# Patient Record
Sex: FEMALE | Race: WHITE | NOT HISPANIC OR LATINO
[De-identification: names, ages, dates, MRNs, and addresses within clinical notes are randomized per-mention and may not be internally consistent; named-entity substitution may affect disease eponyms.]

---

## 2020-02-27 ENCOUNTER — RESULT REVIEW (OUTPATIENT)
Age: 75
End: 2020-02-27

## 2021-11-24 PROBLEM — Z00.00 ENCOUNTER FOR PREVENTIVE HEALTH EXAMINATION: Status: ACTIVE | Noted: 2021-11-24

## 2021-12-17 ENCOUNTER — APPOINTMENT (OUTPATIENT)
Dept: ORTHOPEDIC SURGERY | Facility: CLINIC | Age: 76
End: 2021-12-17
Payer: MEDICARE

## 2021-12-17 VITALS
BODY MASS INDEX: 32.18 KG/M2 | SYSTOLIC BLOOD PRESSURE: 100 MMHG | HEIGHT: 67 IN | DIASTOLIC BLOOD PRESSURE: 62 MMHG | WEIGHT: 205 LBS | HEART RATE: 74 BPM

## 2021-12-17 PROCEDURE — 72082 X-RAY EXAM ENTIRE SPI 2/3 VW: CPT

## 2021-12-17 PROCEDURE — 99204 OFFICE O/P NEW MOD 45 MIN: CPT

## 2021-12-17 NOTE — PHYSICAL EXAM
[de-identified] : Cervical Physical Exam\par \par Gait - Antalgic gait\par \par Station - Normal\par \par Sagittal balance - Normal \par \par Compensatory mechanism? - None\par \par Horizontal gaze - Maintained\par \par Reflexes\par Biceps - Normal\par Triceps - Normal\par Brachioradialis - Normal\par Patellar - Normal\par Gastroc - Normal\par Clonus -No\par \par Morales´s - None\par \par Shoulder Exam - Normal\par \par Spurling´s - None\par \par Wrist Pulses -2+ radial/ulnar\par \par Foot Pulses -2+ DP/PT\par \par Cervical range of motion - Normal\par \par Sensation \par C5-T1 sensation intact to light touch bilaterally\par \par L1-S1 sensation intact to light touch bilaterally\par \par Motor\par \par \par 	Deltoid	Biceps	Triceps	WF	WE	IO	\par Right	3+/5	5/5	5/5	5/5	5/5	5/5	5/5\par Left	5/5	5/5	5/5	5/5	5/5	5/5	5/5\par \par \par 	IP	Quad	HS	TA	Gastroc	EHL\par Right	5/5	5/5	5/5	5/5	5/5	5/5\par Left	5/5	5/5	5/5	5/5	5/5	5/5 [de-identified] : Scoliosis radiographs\par 40 degrees of pelvic incidence\par 42 degrees of lumbar lordosis

## 2021-12-17 NOTE — HISTORY OF PRESENT ILLNESS
[de-identified] : This is a 76-year-old female who is here today for evaluation of her neck and back complaints.  In terms of her neck pain the majority of her pain is in her mid back between her scapula.  She denies any radiating symptoms.  She does endorse issues with balance.  She does endorse issues with dropping objects.\par \par Terms of her back pain she cannot walk even 4 blocks due to pain.  She does have significant bilateral lower extremity symptoms as well.  She denies any bowel bladder issues.  She denies any saddle anesthesia.

## 2021-12-23 DIAGNOSIS — M54.9 DORSALGIA, UNSPECIFIED: ICD-10-CM

## 2022-01-27 PROBLEM — Z00.00 ENCOUNTER FOR PREVENTIVE HEALTH EXAMINATION: Status: ACTIVE | Noted: 2022-01-27

## 2022-01-31 ENCOUNTER — APPOINTMENT (OUTPATIENT)
Dept: ORTHOPEDIC SURGERY | Facility: CLINIC | Age: 77
End: 2022-01-31

## 2022-02-14 ENCOUNTER — APPOINTMENT (OUTPATIENT)
Dept: ORTHOPEDIC SURGERY | Facility: CLINIC | Age: 77
End: 2022-02-14
Payer: MEDICARE

## 2022-02-14 VITALS — BODY MASS INDEX: 32.33 KG/M2 | HEIGHT: 67 IN | WEIGHT: 206 LBS

## 2022-02-14 DIAGNOSIS — G89.29 PAIN IN THORACIC SPINE: ICD-10-CM

## 2022-02-14 DIAGNOSIS — M54.6 PAIN IN THORACIC SPINE: ICD-10-CM

## 2022-02-14 PROCEDURE — 99213 OFFICE O/P EST LOW 20 MIN: CPT

## 2022-02-14 NOTE — PHYSICAL EXAM
[de-identified] : Constitutional: Well-nourished, well-developed, No acute distress, obese\par Respiratory:  Good respiratory effort, no SOB\par Lymphatic: No regional lymphadenopathy, no lymphedema\par Psychiatric: Pleasant and normal affect, alert and oriented x3\par Skin: Clean dry and intact B/L UE/LE\par Musculoskeletal: normal except where as noted in regional exam\par \par Cervical Spine Exam\par APPEARANCE: no marked deformities or malalignment, normal curvature, good posture\par POSITIVE TENDERNESS: + Bilateral upper trapezius, levator scapula, rhomboid major, and rhomboid minor, + spasm noted in the same muscles.\par NONTENDER: no bony midline tenderness.\par ROM: limited in all planes, most notably in flexion and sidebending due to pain\par RESISTIVE TESTING: painful 4/5 resisted ext, bilateral sidebending, rotation and shoulder shrug , 5/5 flexion \par SPECIAL TESTS: neg Spurling's b/l\par Vasc: 2+ radial pulse b/l\par Neuro: C5 - T1 intact to motor, DTRs 2+/4 biceps, triceps, brachioradialis\par Sensation: Intact to light touch throughout b/l UE\par \par Thoracic Spine Exam:\par \par normal curvature and normal alignment. good posture. no midline bony tenderness, + marked spasm and associated tenderness of bilateral paraspinal and periscapular muscles.  ROM mildly limited in sidebending and rotation due to noted spasm\par \par \par Lumbar Spine Exam\par \par APPEARANCE: no marked deformities or malalignment, + loss of lordotic curvature of the lumbosacral spine. + poor posture\par POSITIVE TENDERNESS: + IL/SI/ST ligs, + TTP of b/l SI joints, + b/l lower lumbar tenderness and spasm noted in erector spinae and quadratus lumborum\par NONTENDER: no bony midline tenderness.\par ROM: Mildly limited in all directions, mildly painful at end range of flexion and extension\par RESISTIVE TESTING: painful 4/5 resisted flex/ext, sidebending b/l, and rotation\par SPECIAL TESTS: neg SLR b/l, neg ARIANNA b/l, neg Trendelenburg b/l \par PULSES: 2+ DP/PT pulses\par NEURO:  L1 - S2 intact to sensation and motor, DTRs 2+/4 patella and achilles\par \par \par

## 2022-02-14 NOTE — DISCUSSION/SUMMARY
[de-identified] : Discussed findings of today's exam and possible causes of patient's pain.  Educated patient on their most probable diagnosis of chronic total back pain in the cervical, thoracic and lumbar areas, recent atraumatic exacerbation of primarily thoracic back pain without radiculopathy.  Reviewed possible courses of treatment, and we collaboratively decided best course of treatment at this time will include conservative management.  Patient has numerous years of total back pain, she has been seen by multiple providers for this issue in the past, she has been seen by one of my [facility] who recommended imaging at that time, patient never went to undergo that imaging study.  Patient is primarily been managed by her neurologist and she is on opioid medications by that provider.  Patient has done physical therapy in the past, however she is very hesitant regarding PT at this time to the COVID-19 pandemic.  Patient recently had a CT of the chest due to a pulmonary issue, she does not bring that study with her today.  Patient is advised that I am a nonsurgical sports medicine provider, chronic back pain with multilevel degenerative disc disease and narcotic pain management does not fall within my scope of practice.  Patient is advised I recommend physiatry consultation to discuss risk/benefits of epidural steroid injections.  Patient is recommended to undergo a new course of physical therapy, she would like to defer until she can consider injections in her back.  Patient is advised that she has persisting back pain she can be seen by orthopedic spine for reevaluation at that time.  Patient appreciates and agrees with current plan.\par \par I work as part of an academic orthopedic group and routinely have a physician in training (resident / fellow) working with me.  Any part of the history and physical exam performed by the physician in training was either directly reviewed and/or replicated by myself.  Any procedure performed by the physician in training was performed under my direct supervision and with the consent of the patient.\par \par This note was generated using dragon medical dictation software.  A reasonable effort has been made for proofreading its contents, but typos may still remain.  If there are any questions or points of clarification needed please notify my office.

## 2022-02-14 NOTE — HISTORY OF PRESENT ILLNESS
[de-identified] : Patient is here for back pain that has been a chronic issue. She is unable to identify any particular cause of her pain. She feels that it has worsened. She has seen multiple providers in the past for this issue including orthopedics, neurology, pain management. She is currently taking Oxycodone prescribed by her neurologist. She has attended PT in the past. She was seen by Dr. Roberts in December. He recommended updated imaging. Patient did not undergo those procedures because she had a CT of her chest done for pleural effusions caused by her lupus. She does not recall the name of the facility where that imaging was done. \par \par The patient's past medical history, past surgical history, medications and allergies were reviewed by me today and documented accordingly. In addition, the patient's family and social history, which were noncontributory to this visit, were reviewed also. Intake form was reviewed. The patient has no family history of arthritis.

## 2022-05-22 ENCOUNTER — APPOINTMENT (OUTPATIENT)
Dept: MRI IMAGING | Facility: CLINIC | Age: 77
End: 2022-05-22

## 2022-05-22 ENCOUNTER — OUTPATIENT (OUTPATIENT)
Dept: OUTPATIENT SERVICES | Facility: HOSPITAL | Age: 77
LOS: 1 days | End: 2022-05-22
Payer: MEDICARE

## 2022-05-22 DIAGNOSIS — M54.16 RADICULOPATHY, LUMBAR REGION: ICD-10-CM

## 2022-05-22 PROCEDURE — 72146 MRI CHEST SPINE W/O DYE: CPT | Mod: ME

## 2022-05-22 PROCEDURE — 72141 MRI NECK SPINE W/O DYE: CPT | Mod: ME

## 2022-05-22 PROCEDURE — G1004: CPT

## 2022-05-22 PROCEDURE — 72148 MRI LUMBAR SPINE W/O DYE: CPT | Mod: MH

## 2022-07-13 ENCOUNTER — APPOINTMENT (OUTPATIENT)
Dept: ORTHOPEDIC SURGERY | Facility: CLINIC | Age: 77
End: 2022-07-13

## 2022-07-13 VITALS
SYSTOLIC BLOOD PRESSURE: 105 MMHG | BODY MASS INDEX: 32.65 KG/M2 | HEART RATE: 90 BPM | WEIGHT: 208 LBS | DIASTOLIC BLOOD PRESSURE: 68 MMHG | HEIGHT: 67 IN

## 2022-07-13 DIAGNOSIS — M54.9 DORSALGIA, UNSPECIFIED: ICD-10-CM

## 2022-07-13 DIAGNOSIS — G89.29 DORSALGIA, UNSPECIFIED: ICD-10-CM

## 2022-07-13 PROCEDURE — 99214 OFFICE O/P EST MOD 30 MIN: CPT

## 2022-07-13 RX ORDER — NAPROXEN 500 MG/1
500 TABLET ORAL
Qty: 120 | Refills: 0 | Status: ACTIVE | COMMUNITY
Start: 2022-06-29

## 2022-07-13 RX ORDER — PHENAZOPYRIDINE HYDROCHLORIDE 100 MG/1
100 TABLET ORAL
Qty: 15 | Refills: 0 | Status: ACTIVE | COMMUNITY
Start: 2022-05-18

## 2022-07-13 RX ORDER — CLONAZEPAM 2 MG/1
2 TABLET ORAL
Qty: 30 | Refills: 0 | Status: ACTIVE | COMMUNITY
Start: 2022-03-17

## 2022-07-13 RX ORDER — CEFPODOXIME PROXETIL 200 MG/1
200 TABLET, FILM COATED ORAL
Qty: 14 | Refills: 0 | Status: ACTIVE | COMMUNITY
Start: 2022-05-18

## 2022-07-13 RX ORDER — ALBUTEROL SULFATE 90 UG/1
108 (90 BASE) INHALANT RESPIRATORY (INHALATION)
Qty: 8 | Refills: 0 | Status: ACTIVE | COMMUNITY
Start: 2022-05-10

## 2022-07-13 RX ORDER — LOSARTAN POTASSIUM 100 MG/1
100 TABLET, FILM COATED ORAL
Qty: 90 | Refills: 0 | Status: ACTIVE | COMMUNITY
Start: 2022-03-14

## 2022-07-13 RX ORDER — FLUTICASONE FUROATE AND VILANTEROL 100; 25 UG/1; UG/1
100-25 POWDER RESPIRATORY (INHALATION)
Qty: 60 | Refills: 0 | Status: ACTIVE | COMMUNITY
Start: 2022-05-10

## 2022-07-13 RX ORDER — PREDNISONE 20 MG/1
20 TABLET ORAL
Qty: 42 | Refills: 0 | Status: ACTIVE | COMMUNITY
Start: 2022-01-26

## 2022-07-13 RX ORDER — OXYCODONE AND ACETAMINOPHEN 10; 325 MG/1; MG/1
10-325 TABLET ORAL
Qty: 120 | Refills: 0 | Status: ACTIVE | COMMUNITY
Start: 2022-05-18

## 2022-07-13 RX ORDER — AMLODIPINE BESYLATE 10 MG/1
10 TABLET ORAL
Qty: 90 | Refills: 0 | Status: ACTIVE | COMMUNITY
Start: 2022-07-08

## 2022-07-13 RX ORDER — PREDNISONE 5 MG/1
5 TABLET ORAL
Qty: 60 | Refills: 0 | Status: ACTIVE | COMMUNITY
Start: 2022-03-10

## 2022-07-13 RX ORDER — RANOLAZINE 500 MG/1
500 TABLET, EXTENDED RELEASE ORAL
Qty: 60 | Refills: 0 | Status: ACTIVE | COMMUNITY
Start: 2022-04-13

## 2022-07-13 RX ORDER — TRIAMCINOLONE ACETONIDE 1 MG/G
0.1 OINTMENT TOPICAL
Qty: 80 | Refills: 0 | Status: ACTIVE | COMMUNITY
Start: 2022-01-18

## 2022-07-13 RX ORDER — METAXALONE 800 MG/1
800 TABLET ORAL
Qty: 90 | Refills: 0 | Status: ACTIVE | COMMUNITY
Start: 2022-06-14

## 2022-07-13 RX ORDER — CHLORTHALIDONE 25 MG/1
25 TABLET ORAL
Qty: 90 | Refills: 0 | Status: ACTIVE | COMMUNITY
Start: 2022-06-16

## 2022-07-13 NOTE — PHYSICAL EXAM
[de-identified] : Constitutional: Well-nourished, well-developed, No acute distress, obese\par Respiratory:  Good respiratory effort, no SOB\par Psychiatric: Pleasant and normal affect, alert and oriented x3\par Musculoskeletal: normal except where as noted in regional exam\par \par Cervical Spine Exam\par APPEARANCE: no marked deformities or malalignment, normal curvature, good posture\par POSITIVE TENDERNESS: + Bilateral upper trapezius, levator scapula, rhomboid major, and rhomboid minor, + spasm noted in the same muscles.\par NONTENDER: no bony midline tenderness.\par ROM: limited in all planes, most notably in flexion and sidebending due to pain\par RESISTIVE TESTING: painful 4/5 resisted ext, bilateral sidebending, rotation and shoulder shrug , 5/5 flexion \par SPECIAL TESTS: neg Spurling's b/l\par Vasc: 2+ radial pulse b/l\par Neuro: C5 - T1 intact to motor, DTRs 2+/4 biceps, triceps, brachioradialis\par Sensation: Intact to light touch throughout b/l UE\par \par Thoracic Spine Exam:\par \par normal curvature and normal alignment. good posture. no midline bony tenderness, + marked spasm and associated tenderness of bilateral paraspinal and periscapular muscles.  ROM mildly limited in sidebending and rotation due to noted spasm\par \par \par Lumbar Spine Exam\par \par APPEARANCE: no marked deformities or malalignment, + loss of lordotic curvature of the lumbosacral spine. + poor posture\par POSITIVE TENDERNESS: + IL/SI/ST ligs, + TTP of b/l SI joints, + b/l lower lumbar tenderness and spasm noted in erector spinae and quadratus lumborum\par NONTENDER: no bony midline tenderness.\par ROM: Mildly limited in all directions, mildly painful at end range of flexion and extension\par RESISTIVE TESTING: painful 4/5 resisted flex/ext, sidebending b/l, and rotation\par SPECIAL TESTS: neg SLR b/l, neg ARIANNA b/l, neg Trendelenburg b/l \par PULSES: 2+ DP/PT pulses\par NEURO:  L1 - S2 intact to sensation and motor, DTRs 2+/4 patella and achilles\par \par \par  [de-identified] : I reviewed, interpreted and clinically correlated the following outside imaging studies,\par \par EXAM: [id number] - MR SPINE LUMBAR  - ORDERED BY: LOUISA GARCIA\par \par EXAM: [id number] - MR SPINE CERVICAL  - ORDERED BY: BALTAZAR DAVE\par \par EXAM: [id number] - MR SPINE THORACIC  - ORDERED BY: BALTAZAR DAVE\par \par \par PROCEDURE DATE:  05/22/2022\par \par \par \par INTERPRETATION:  CLINICAL INDICATION: Ataxia, nontraumatic. Pain in the cervical, thoracic, lumbar, and sacral spine. Low back muscle strain. History of SLE.\par \par Technique: Multiplanar, multisequence MRI of the cervical, thoracic, and lumbar spine without intravenous contrast.\par \par Comparison: None.\par \par FINDINGS:\par \par CERVICAL SPINE:\par ALIGNMENT: Cervical lordosis is maintained. There is no spondylolisthesis. Mild straightening of the cervical lordosis.\par \par VERTEBRAL BODIES: There is no vertebral body compression deformity.\par \par DISC SPACES: Mild loss of disc height is seen at C5/C6.\par \par MARROW: Marrow signal is within normal limits.\par \par IMAGED BRAIN: Within normal limits.\par \par CERVICAL CORD: Cervical cord is normal in caliber and signal characteristics.\par \par IMAGED NECK SOFT TISSUES: Within normal limits.\par \par The findings at the individual levels are as follows:\par \par C1/2: Mild arthrosis at the atlantoaxial articulation.\par \par C2/3: Uncovertebral joint arthrosis results in mild right neural foraminal stenosis. No spinal canal or left neural foraminal stenosis.\par \par C3/4: Disc osteophyte complex and uncovertebral joint arthrosis results in moderate right neural foraminal stenosis, and mild left neural foraminal stenosis. No significant spinal canal stenosis.\par \par C4/5: Disc osteophyte complex and uncovertebral joint arthrosis results in mild left and moderate right neural foraminal stenosis. No significant spinal canal stenosis.\par \par C5/6: Disc osteophyte complex with rightward predominance and uncovertebral joint arthrosis and facet arthrosis and thickening of the ligamentum flavum results in mild spinal canal stenosis, severe right neural foraminal stenosis, and mild left neural foraminal stenosis.\par \par C6/7: Disc osteophyte complex with superimposed central disc protrusion and uncovertebral spurring, facet arthrosis and mild thickening of the ligamentum flavum results in mild spinal canal stenosis with protrusion focally indenting the ventral cord stenosis and mild to moderate bilateral neural foraminal stenosis.\par \par C7/T1: Disc osteophyte complex results in mild spinal canal stenosis. No significant neural foraminal stenosis.\par \par THORACIC SPINE:\par ALIGNMENT: Thoracic kyphosis is mildly exaggerated. No spondylolisthesis.\par \par VERTEBRAL BODIES: No acute fracture.\par \par DISC SPACES: Mild loss of disc height is seen at several levels.\par \par MARROW: Within normal limits. A focal fatty deposit is seen in the T12 vertebral body.\par \par THORACIC CORD: Normal size and signal.\par \par IMAGED THORACIC SOFT TISSUES: Within normal limits.\par \par The findings at individual levels are as follows:\par \par T1/T2: Small disc bulge with superimposed small left paracentral protrusion resulting in indentation of the thecal sac. Mild foraminal narrowing.\par \par T2/T3: Small disc bulge results in mild neural foraminal stenosis. No significant spinal canal stenosis.\par \par T3/T4, T4/T5: No spinal canal or neural foraminal stenosis.\par \par T5/T6 and T6/T7: Small disc bulge without significant spinal canal or neural foraminal stenosis.\par \par T7/T8, T8/T9, T9/T10: No spinal canal or neural foraminal stenosis.\par \par T10/T11: Small circumferential disc bulge results in mild bilateral neural foraminal stenosis. No significant neural canal stenosis.\par \par T11/T12: Circumferential disc bulge results in mild to moderate bilateral neural foraminal stenosis. No significant spinal canal stenosis.\par \par LUMBAR SPINE:\par ALIGNMENT: The lumbar lordosis is maintained. There is minimal retrolisthesis of L5 on S1. There is mild leftward lateral listhesis of L4 and L5. Multilevel loss of intervertebral disc height.\par \par VERTEBRAL BODIES: No acute compression deformity.\par \par DISC SPACES: Intact.\par \par MARROW: Within normal limits.\par \par SACROILIAC JOINTS: Intact.\par \par CONUS AND CAUDA EQUINA: The conus terminates at L1. The cauda equina is unremarkable.\par \par IMAGED ABDOMINAL AND PELVIC STRUCTURES: Unremarkable.\par \par The findings at the individual levels are as follows:\par \par T12/L1: Minimal disc bulge. No spinal canal or neural foraminal stenosis.\par \par L1/2: There is no spinal canal or neural foraminal stenosis. Mild facet arthrosis.\par \par L2/3: Disc bulge, facet arthrosis, and ligamentum flavum hypertrophy results in moderate to severe spinal canal stenosis with contacting of the cauda equina, mild left neural foraminal stenosis.\par \par L3/4: Disc bulge, facet arthrosis, and ligamentum flavum hypertrophy results in mild spinal canal stenosis, narrowing of the lateral recesses and moderate bilateral neural foraminal stenosis.\par \par L4/5: Disc bulge, facet arthrosis, ligament flavum hypertrophy results in mild spinal canal stenosis, narrowing of the lateral recesses, right greater than left and severe right neural foraminal stenosis with contacting of the exiting right L5 nerve root, and moderate left neural foraminal stenosis.\par \par L5/S1: Small disc bulge with superimposed far left lateral disc bulge results in mild bilateral neural foraminal stenosis. No significant spinal canal stenosis. Left lateral disc herniation may contact the exiting left L5 nerve root in an extraforaminal location.\par \par IMPRESSION:\par Cervical spine:\par 1.  At C3/C4 and C4/C5, there is moderate right neural foraminal stenosis.\par 2.  At C5/C6, there is severe right neural foraminal stenosis.\par 3.  At C6/C7, there is mild spinal canal stenosis superimposed protrusion focally indenting the ventral cord.\par \par Thoracic spine:\par 1.  Mildly exaggerated thoracic kyphosis without acute fracture.\par 2.  At T11/T12, there is mild to moderate bilateral neural foraminal stenosis.\par \par Lumbar spine:\par 1.  At L2/L3, there is moderate to severe spinal canal stenosis.\par 2.  At L3/L4, there is moderate bilateral neural foraminal stenosis.\par 3.  At L4/L5, there is severe right neural foraminal stenosis with contacting of the exiting right L5 nerve root, and moderate left neural foraminal stenosis.\par

## 2022-07-13 NOTE — HISTORY OF PRESENT ILLNESS
[de-identified] : Patient is here for back pain follow up. Patient had MRIs performed in May that were ordered by Dr. Roberts. She has not followed up with him. She did not go to PT and she did not see PMR to discuss DASHAWN.

## 2022-07-13 NOTE — DISCUSSION/SUMMARY
[de-identified] : Patient was seen today for reevaluation of chronic neck, thoracic, and low back pain with intermittent radiculopathy.  Patient had MRI of the cervical spine, thoracic spine, and lumbar spine ordered by outside physicians since last evaluation, she wanted to review those today.  I reviewed all the images in detail with her today, I showed her both her images and online images from [employer] to help her understand what degenerative disc disease is and how this is the etiology of her chronic pain.  Patient is advised that I would recommend physiatry consultation, she was educated that the group in this building is called [facility], they are spine injection doctors and can provide epidural steroid injections to help with her chronic pain.  Patient is advised that she does not have improvement with these measures she could then follow-up with orthopedic spine to discuss possible surgical intervention.  She is already been seen by both physiatry and orthopedic spine, she is advised she does not need to keep following up with me as I am a nonsurgical sports medicine provider and do not manage chronic back pain beyond what these other providers can provide.  Patient is already educated about the use of physical therapy, and she is already expressed that she is unwilling to go to PT secondary to the COVID-19 pandemic.  Patient was given a handout of home exercises that she can proceed with as tolerated.  Patient appreciates and agrees with current plan.\par \par \par This note was generated using dragon medical dictation software.  A reasonable effort has been made for proofreading its contents, but typos may still remain.  If there are any questions or points of clarification needed please notify my office.

## 2022-07-14 ENCOUNTER — NON-APPOINTMENT (OUTPATIENT)
Age: 77
End: 2022-07-14

## 2022-09-20 ENCOUNTER — APPOINTMENT (OUTPATIENT)
Dept: RADIOLOGY | Facility: CLINIC | Age: 77
End: 2022-09-20

## 2022-09-20 ENCOUNTER — OUTPATIENT (OUTPATIENT)
Dept: OUTPATIENT SERVICES | Facility: HOSPITAL | Age: 77
LOS: 1 days | End: 2022-09-20
Payer: MEDICARE

## 2022-09-20 DIAGNOSIS — Z00.00 ENCOUNTER FOR GENERAL ADULT MEDICAL EXAMINATION WITHOUT ABNORMAL FINDINGS: ICD-10-CM

## 2022-09-20 PROCEDURE — 73522 X-RAY EXAM HIPS BI 3-4 VIEWS: CPT

## 2023-05-12 ENCOUNTER — APPOINTMENT (OUTPATIENT)
Dept: MAMMOGRAPHY | Facility: CLINIC | Age: 78
End: 2023-05-12
Payer: MEDICARE

## 2023-05-12 ENCOUNTER — OUTPATIENT (OUTPATIENT)
Dept: OUTPATIENT SERVICES | Facility: HOSPITAL | Age: 78
LOS: 1 days | End: 2023-05-12
Payer: MEDICARE

## 2023-05-12 ENCOUNTER — APPOINTMENT (OUTPATIENT)
Dept: MRI IMAGING | Facility: CLINIC | Age: 78
End: 2023-05-12
Payer: MEDICARE

## 2023-05-12 DIAGNOSIS — R10.2 PELVIC AND PERINEAL PAIN: ICD-10-CM

## 2023-05-12 PROCEDURE — 77063 BREAST TOMOSYNTHESIS BI: CPT | Mod: 26

## 2023-05-12 PROCEDURE — 77063 BREAST TOMOSYNTHESIS BI: CPT

## 2023-05-12 PROCEDURE — 72197 MRI PELVIS W/O & W/DYE: CPT | Mod: MH

## 2023-05-12 PROCEDURE — 72197 MRI PELVIS W/O & W/DYE: CPT | Mod: 26,MH

## 2023-05-12 PROCEDURE — 77067 SCR MAMMO BI INCL CAD: CPT | Mod: 26

## 2023-05-12 PROCEDURE — A9585: CPT

## 2023-05-12 PROCEDURE — 77067 SCR MAMMO BI INCL CAD: CPT

## 2023-05-30 ENCOUNTER — APPOINTMENT (OUTPATIENT)
Dept: ORTHOPEDIC SURGERY | Facility: CLINIC | Age: 78
End: 2023-05-30
Payer: MEDICARE

## 2023-05-30 VITALS — HEIGHT: 67 IN | BODY MASS INDEX: 32.02 KG/M2 | WEIGHT: 204 LBS

## 2023-05-30 DIAGNOSIS — M25.559 PAIN IN UNSPECIFIED HIP: ICD-10-CM

## 2023-05-30 DIAGNOSIS — M16.0 BILATERAL PRIMARY OSTEOARTHRITIS OF HIP: ICD-10-CM

## 2023-05-30 PROCEDURE — 73523 X-RAY EXAM HIPS BI 5/> VIEWS: CPT

## 2023-05-30 PROCEDURE — 99214 OFFICE O/P EST MOD 30 MIN: CPT

## 2023-05-30 NOTE — HISTORY OF PRESENT ILLNESS
[de-identified] : 78-year-old female presents with 3 months of bilateral hip pain, left worse than right.  No associated injury.  Pain is located within the groin and is severe in intensity. The pain substantially limits activities of daily living. Walking tolerance is reduced. Medication including NSAIDs and activity modification have been minimally effective for a period lasting greater than three months in duration.  The patient denies any radiation of the pain to the feet and it is not associated with numbness, tingling, or weakness.  She has chronic back pain and has had spinal injections in the past.  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  She has not been able to do physical therapy due to the severity of the pain.\par PMH: Lupus, chronic dyspnea, sees a cardiologist and pulmonologist.

## 2023-05-30 NOTE — DISCUSSION/SUMMARY
[de-identified] : 78-year-old female with severe bilateral hip pain secondary to advanced degenerative arthritis.\par I discussed the treatment of degenerative arthritis with the patient at length today. I described the spectrum of treatment from nonoperative modalities to total joint arthroplasty. Noninvasive and nonoperative treatment modalities include weight reduction, activity modification with low impact exercise, PRN use of acetaminophen or anti-inflammatory medication if tolerated, glucosamine/chondroitin supplements, and physical therapy. Further treatments can include corticosteroid injection and the use of hyaluronic acid injections. Definitive treatment can certainly include total joint arthroplasty also. The risks and benefits of each treatment options was discussed and all questions were answered. \par She is not interested in formal physical therapy.  She will start home exercise program.  She will follow-up in 2 months for reevaluation.

## 2023-05-30 NOTE — PHYSICAL EXAM
[Normal] : Gait: normal [de-identified] : General: No acute distress\par Mental: Alert and oriented x3\par Eyes: Conjunctivitis not seen\par Chest: Symmetric chest rise, no audible wheezing\par Skin: Bilateral lower extremities absent from rashes and ulcers\par Abdomen: No distention\par \par Leg lengths equal.\par Right hip: Mildly tender at the greater trochanter, nontender ASIS\par Hip flexion 40, external rotation 30, internal rotation 20.  Positive Stinchfield, positive ARIANNA, positive FADIR.\par 5/5 hip flexion and abduction strength\par Distally full motor and sensory intact with a palpable DP pulse.\par \par Left hip: Mildly tender at the greater trochanter, nontender ASIS\par Hip flexion 40, external rotation 30, internal rotation 20.  Positive Stinchfield, positive ARIANNA, positive FADIR.\par 5/5 hip flexion and abduction strength\par Distally full motor and sensory intact with a palpable DP pulse. [de-identified] : Pelvis and bilateral hip x-rays performed today.\par Bilateral hips show severe narrowing of the joint space with extensive subchondral sclerosis and osteophytes.  There is lumbosacral spondylosis with degenerative changes.\par \par Pelvis MRI from 5/12/2023 shows severe arthritis.